# Patient Record
Sex: FEMALE | Race: WHITE
[De-identification: names, ages, dates, MRNs, and addresses within clinical notes are randomized per-mention and may not be internally consistent; named-entity substitution may affect disease eponyms.]

---

## 2018-12-18 ENCOUNTER — HOSPITAL ENCOUNTER (INPATIENT)
Dept: HOSPITAL 35 - 4W | Age: 73
LOS: 2 days | Discharge: HOME | DRG: 853 | End: 2018-12-20
Attending: INTERNAL MEDICINE | Admitting: INTERNAL MEDICINE
Payer: COMMERCIAL

## 2018-12-18 VITALS — SYSTOLIC BLOOD PRESSURE: 123 MMHG | DIASTOLIC BLOOD PRESSURE: 52 MMHG

## 2018-12-18 VITALS — WEIGHT: 173 LBS | BODY MASS INDEX: 29.53 KG/M2 | HEIGHT: 64.02 IN

## 2018-12-18 DIAGNOSIS — G47.33: ICD-10-CM

## 2018-12-18 DIAGNOSIS — A41.9: Primary | ICD-10-CM

## 2018-12-18 DIAGNOSIS — F32.9: ICD-10-CM

## 2018-12-18 DIAGNOSIS — M19.90: ICD-10-CM

## 2018-12-18 DIAGNOSIS — I50.9: ICD-10-CM

## 2018-12-18 DIAGNOSIS — E78.5: ICD-10-CM

## 2018-12-18 DIAGNOSIS — I11.0: ICD-10-CM

## 2018-12-18 DIAGNOSIS — Z84.1: ICD-10-CM

## 2018-12-18 DIAGNOSIS — Z79.82: ICD-10-CM

## 2018-12-18 DIAGNOSIS — E43: ICD-10-CM

## 2018-12-18 DIAGNOSIS — Z79.899: ICD-10-CM

## 2018-12-18 DIAGNOSIS — K35.80: ICD-10-CM

## 2018-12-18 DIAGNOSIS — K21.9: ICD-10-CM

## 2018-12-18 PROCEDURE — 70005: CPT

## 2018-12-18 PROCEDURE — 50101: CPT

## 2018-12-18 PROCEDURE — 56462: CPT

## 2018-12-18 PROCEDURE — 54118: CPT

## 2018-12-18 PROCEDURE — 56639: CPT

## 2018-12-18 PROCEDURE — 53307: CPT

## 2018-12-18 PROCEDURE — 50558: CPT

## 2018-12-18 PROCEDURE — 50249: CPT

## 2018-12-18 PROCEDURE — 50739: CPT

## 2018-12-18 PROCEDURE — 51489: CPT

## 2018-12-18 PROCEDURE — 65130 INSERT OCULAR IMPLANT: CPT

## 2018-12-18 PROCEDURE — 50555 KIDNEY ENDOSCOPY & BIOPSY: CPT

## 2018-12-18 PROCEDURE — 50740 FUSION OF URETER & KIDNEY: CPT

## 2018-12-18 PROCEDURE — 54022: CPT

## 2018-12-18 PROCEDURE — 56526: CPT

## 2018-12-18 PROCEDURE — 62110: CPT

## 2018-12-18 PROCEDURE — 50010 RENAL EXPLORATION: CPT

## 2018-12-18 PROCEDURE — 62900: CPT

## 2018-12-18 PROCEDURE — 53310: CPT

## 2018-12-18 PROCEDURE — 56525: CPT

## 2018-12-18 PROCEDURE — 50411: CPT

## 2018-12-18 PROCEDURE — 51975: CPT

## 2018-12-18 PROCEDURE — 65129: CPT

## 2018-12-18 PROCEDURE — 10047: CPT

## 2018-12-19 VITALS — DIASTOLIC BLOOD PRESSURE: 59 MMHG | SYSTOLIC BLOOD PRESSURE: 105 MMHG

## 2018-12-19 VITALS — SYSTOLIC BLOOD PRESSURE: 124 MMHG | DIASTOLIC BLOOD PRESSURE: 50 MMHG

## 2018-12-19 VITALS — SYSTOLIC BLOOD PRESSURE: 115 MMHG | DIASTOLIC BLOOD PRESSURE: 48 MMHG

## 2018-12-19 VITALS — DIASTOLIC BLOOD PRESSURE: 50 MMHG | SYSTOLIC BLOOD PRESSURE: 116 MMHG

## 2018-12-19 VITALS — DIASTOLIC BLOOD PRESSURE: 49 MMHG | SYSTOLIC BLOOD PRESSURE: 115 MMHG

## 2018-12-19 VITALS — DIASTOLIC BLOOD PRESSURE: 51 MMHG | SYSTOLIC BLOOD PRESSURE: 109 MMHG

## 2018-12-19 VITALS — SYSTOLIC BLOOD PRESSURE: 107 MMHG | DIASTOLIC BLOOD PRESSURE: 46 MMHG

## 2018-12-19 VITALS — SYSTOLIC BLOOD PRESSURE: 115 MMHG | DIASTOLIC BLOOD PRESSURE: 51 MMHG

## 2018-12-19 LAB
ANION GAP SERPL CALC-SCNC: 8 MMOL/L (ref 7–16)
BUN SERPL-MCNC: 12 MG/DL (ref 7–18)
CALCIUM SERPL-MCNC: 8.4 MG/DL (ref 8.5–10.1)
CHLORIDE SERPL-SCNC: 103 MMOL/L (ref 98–107)
CO2 SERPL-SCNC: 28 MMOL/L (ref 21–32)
CREAT SERPL-MCNC: 1 MG/DL (ref 0.6–1)
ERYTHROCYTE [DISTWIDTH] IN BLOOD BY AUTOMATED COUNT: 13.4 % (ref 10.5–14.5)
GLUCOSE SERPL-MCNC: 89 MG/DL (ref 74–106)
HCT VFR BLD CALC: 35.6 % (ref 37–47)
HGB BLD-MCNC: 11.5 GM/DL (ref 12–15)
INR PPP: 1.3
MCH RBC QN AUTO: 29.9 PG (ref 26–34)
MCHC RBC AUTO-ENTMCNC: 32.3 G/DL (ref 28–37)
MCV RBC: 92.4 FL (ref 80–100)
PLATELET # BLD: 339 THOU/UL (ref 150–400)
POTASSIUM SERPL-SCNC: 3.6 MMOL/L (ref 3.5–5.1)
PROTHROMBIN TIME: 13.9 SECONDS (ref 9.3–11.4)
RBC # BLD AUTO: 3.85 MIL/UL (ref 4.2–5)
SODIUM SERPL-SCNC: 139 MMOL/L (ref 136–145)
WBC # BLD AUTO: 18.3 THOU/UL (ref 4–11)

## 2018-12-19 PROCEDURE — 0DTJ4ZZ RESECTION OF APPENDIX, PERCUTANEOUS ENDOSCOPIC APPROACH: ICD-10-PCS | Performed by: SURGERY

## 2018-12-20 VITALS — DIASTOLIC BLOOD PRESSURE: 54 MMHG | SYSTOLIC BLOOD PRESSURE: 139 MMHG

## 2018-12-20 VITALS — DIASTOLIC BLOOD PRESSURE: 54 MMHG | SYSTOLIC BLOOD PRESSURE: 130 MMHG

## 2018-12-20 VITALS — SYSTOLIC BLOOD PRESSURE: 129 MMHG | DIASTOLIC BLOOD PRESSURE: 48 MMHG

## 2018-12-20 LAB
ALBUMIN SERPL-MCNC: 2.7 G/DL (ref 3.4–5)
ALT SERPL-CCNC: 35 U/L (ref 30–65)
ANION GAP SERPL CALC-SCNC: 13 MMOL/L (ref 7–16)
AST SERPL-CCNC: 25 U/L (ref 15–37)
BASOPHILS NFR BLD AUTO: 0.1 % (ref 0–2)
BILIRUB SERPL-MCNC: 0.4 MG/DL
BUN SERPL-MCNC: 16 MG/DL (ref 7–18)
CALCIUM SERPL-MCNC: 8.6 MG/DL (ref 8.5–10.1)
CHLORIDE SERPL-SCNC: 103 MMOL/L (ref 98–107)
CO2 SERPL-SCNC: 22 MMOL/L (ref 21–32)
CREAT SERPL-MCNC: 0.9 MG/DL (ref 0.6–1)
EOSINOPHIL NFR BLD: 0 % (ref 0–3)
ERYTHROCYTE [DISTWIDTH] IN BLOOD BY AUTOMATED COUNT: 13.7 % (ref 10.5–14.5)
GLUCOSE SERPL-MCNC: 113 MG/DL (ref 74–106)
GRANULOCYTES NFR BLD MANUAL: 85.6 % (ref 36–66)
HCT VFR BLD CALC: 36.8 % (ref 37–47)
HGB BLD-MCNC: 12.1 GM/DL (ref 12–15)
LYMPHOCYTES NFR BLD AUTO: 8.3 % (ref 24–44)
MAGNESIUM SERPL-MCNC: 1.8 MG/DL (ref 1.8–2.4)
MCH RBC QN AUTO: 30.6 PG (ref 26–34)
MCHC RBC AUTO-ENTMCNC: 33 G/DL (ref 28–37)
MCV RBC: 92.8 FL (ref 80–100)
MONOCYTES NFR BLD: 6 % (ref 1–8)
NEUTROPHILS # BLD: 16.2 THOU/UL (ref 1.4–8.2)
PLATELET # BLD: 335 THOU/UL (ref 150–400)
POTASSIUM SERPL-SCNC: 3.6 MMOL/L (ref 3.5–5.1)
PROT SERPL-MCNC: 6.9 G/DL (ref 6.4–8.2)
RBC # BLD AUTO: 3.97 MIL/UL (ref 4.2–5)
SODIUM SERPL-SCNC: 138 MMOL/L (ref 136–145)
WBC # BLD AUTO: 19 THOU/UL (ref 4–11)

## 2018-12-24 NOTE — PATH
[facility]
[address]
[place]   [address]                     PATHOLOGY RPT PROCEDURE       
_______________________________________________________________________________
 
Name:       LINDSAY ORNELAS                 Room #:         456-P       [facility] [place]  
.R.#:      [id number]     Account #:      [id number]  
Admission:  12/18/18    Date of Birth:  [birth year]  
Discharge:  12/20/18                                    Report #:    [id number]
                                                        Path Case #: [id number]
_______________________________________________________________________________
 
[facility] Accession Number: [id number]
.                                                                01
Material submitted:                                        .
APPENDIX
.                                                                01
Clinical history:                                          .
Acute appendicitis
.                                                                02
**********************************************************************
Diagnosis:
Appendix "appendix", appendectomy:
- Acute suppurative appendicitis with acute inflammation extending into
the periappendiceal fat with micro abscess formation.
(SHA:pit 12/21/2018)
QTP/12/21/2018
**********************************************************************
.                                                                02
Electronically signed:                                     .
Marlon Goetz MD, Pathologist
NPI- [id number]
.                                                                01
Gross description:                                         .
The specimen is received in formalin, labeled "Lindsay Ornelas, appendix".
Received is a vermiform appendix measuring 6.4 cm in length by up to 1.1
cm in diameter with a small amount of attached mesoappendix.  The serosal
surface is gray-tan and shaggy in appearance.  Sectioning reveals a patent
to dilated lumen filled with light tan possible fecal material.  The
specimen is submitted representatively in cassettes A1 and A2, with the
proximal margin and bisected tips submitted in cassette A1.
([facility]; 12/20/2018)
QAC/QAC
.                                                                02
Pathologist provided ICD-10:
K35.80
.                                                                02
CPT                                                        .
132694
Specimen Comment: A courtesy copy of this report has been sent to
Specimen Comment: [phone], [phone], [phone].
Specimen Comment: Report sent to ,DR BYERS / DR ROMAN
Specimen Comment: A duplicate report has been generated due to demographic
updates.
***Performed at:  01
   [address]  [id number]
   MD Trenton Bhat MD Phone:  [phone]
***Performed at:  02
 
 
[place] [address]                     PATHOLOGY RPT PROCEDURE       
_______________________________________________________________________________
 
Name:       ORNELASLINDSAY                 Room #:         456-P       [facility] [place]  
M.R.#:      [id number]     Account #:      [id number]  
Admission:  12/18/18    Date of Birth:  [birth year]  
Discharge:  12/20/18                                    Report #:    [id number]
                                                        Path Case #: [id number]
_______________________________________________________________________________
   [facility] [address], [place], [place]  [id number]
   MD Yuki Díaz MD Phone:  [phone]

## 2020-06-01 ENCOUNTER — HOSPITAL ENCOUNTER (OUTPATIENT)
Dept: HOSPITAL 35 - SJCVC | Age: 75
End: 2020-06-01
Attending: INTERNAL MEDICINE
Payer: COMMERCIAL

## 2020-06-01 DIAGNOSIS — Z79.82: ICD-10-CM

## 2020-06-01 DIAGNOSIS — E78.00: ICD-10-CM

## 2020-06-01 DIAGNOSIS — Z79.899: ICD-10-CM

## 2020-06-01 DIAGNOSIS — R06.00: ICD-10-CM

## 2020-06-01 DIAGNOSIS — M79.89: ICD-10-CM

## 2020-06-01 DIAGNOSIS — K21.9: ICD-10-CM

## 2020-06-01 DIAGNOSIS — I10: ICD-10-CM

## 2020-06-01 DIAGNOSIS — R94.31: Primary | ICD-10-CM

## 2020-07-13 ENCOUNTER — HOSPITAL ENCOUNTER (OUTPATIENT)
Dept: HOSPITAL 35 - SJCVCIMAG | Age: 75
End: 2020-07-13
Attending: INTERNAL MEDICINE
Payer: COMMERCIAL

## 2020-07-13 DIAGNOSIS — I11.9: ICD-10-CM

## 2020-07-13 DIAGNOSIS — E78.00: ICD-10-CM

## 2020-07-13 DIAGNOSIS — I08.8: Primary | ICD-10-CM

## 2020-07-13 DIAGNOSIS — Z79.899: ICD-10-CM

## 2020-10-01 ENCOUNTER — HOSPITAL ENCOUNTER (OUTPATIENT)
Dept: HOSPITAL 35 - SJCVC | Age: 75
End: 2020-10-01
Attending: INTERNAL MEDICINE
Payer: COMMERCIAL

## 2020-10-01 DIAGNOSIS — K21.9: ICD-10-CM

## 2020-10-01 DIAGNOSIS — R06.00: ICD-10-CM

## 2020-10-01 DIAGNOSIS — R00.1: Primary | ICD-10-CM

## 2020-10-01 DIAGNOSIS — I10: ICD-10-CM

## 2020-10-01 DIAGNOSIS — R07.9: ICD-10-CM

## 2020-10-01 DIAGNOSIS — I35.1: ICD-10-CM

## 2020-10-08 ENCOUNTER — HOSPITAL ENCOUNTER (OUTPATIENT)
Dept: HOSPITAL 35 - SJCVCIMAG | Age: 75
End: 2020-10-08
Attending: INTERNAL MEDICINE
Payer: COMMERCIAL

## 2020-10-08 DIAGNOSIS — I35.1: ICD-10-CM

## 2020-10-08 DIAGNOSIS — Z79.899: ICD-10-CM

## 2020-10-08 DIAGNOSIS — R06.00: ICD-10-CM

## 2020-10-08 DIAGNOSIS — R07.9: Primary | ICD-10-CM

## 2020-10-08 DIAGNOSIS — I10: ICD-10-CM
